# Patient Record
Sex: MALE | Race: WHITE
[De-identification: names, ages, dates, MRNs, and addresses within clinical notes are randomized per-mention and may not be internally consistent; named-entity substitution may affect disease eponyms.]

---

## 2019-02-22 NOTE — CT
CT BRAIN WITHOUT CONTRAST:

 

Date:  02/22/19 

 

INDICATION:

Syncope and confusion, with history of dementia. 

 

COMPARISON:  

None. 

 

FINDINGS:

There is moderate to severe chronic small vessel white matter ischemic change. There are remote lacun
ar infarcts involving the right corona radiata, right caudate head, left thalamus, and right globus p
allidus. Septum pellucidum and third ventricle are midline. No definite acute infarct, hemorrhage, or
 hydrocephalus is present. Mastoid air cells and paranasal sinuses are clear. 

 

IMPRESSION: 

No acute intracranial abnormality. Chronic ischemic change. 

 

 

POS: [facility]

## 2019-02-22 NOTE — CT
CT ABDOMEN AND PELVIS WITH IV CONTRAST:

 

Date:  02/22/19 

 

HISTORY:  

Syncopal episode and collapse. Right lower quadrant pain and guarding. 

 

COMPARISON:  

None. 

 

FINDINGS:

 

There is bibasilar atelectasis. 

 

No focal hepatic lesion is evident. 

 

The spleen, pancreas, and right adrenal gland are normal appearing. There is a 1.4 cm nodule involvin
g the left adrenal gland. The kidneys are normal appearing. 

 

There are moderate calcifications involving the abdominopelvic vasculature. 

 

There is a prominent amount of retained stool in the colon and rectum. There is a normal appendix in 
the right lower quadrant. The small bowel is of normal caliber. 

 

No free fluid or pathologically enlarged lymph nodes are evident. There is a large, fat-containing in
guinal hernia. 

 

No definite acute osseous abnormality is evident. There is scattered degenerative and osteoarthritic 
changes. There is diffuse osteopenia. 

 

IMPRESSION: 

1.  Prominent amount of retained stool within the rectum and colon. 

 

2.  Mild bibasilar atelectasis. 

 

3.  Nonspecific left adrenal nodule measuring 1.4 cm. Follow-up CT of the abdomen utilizing adrenal m
ass protocol may be helpful for improved characterization. 

 

4.  Other chronic findings as above. 

 

 

POS: [facility]

## 2019-02-22 NOTE — PDOC.FPRHP
- History of Present Illness


Chief Complaint: Syncopal episode


History of Present Illness: 





Pt is an 84 yo M with PMH of CABG (x4 grafts) 10 yrs prior, HTN, BPH, dementia 

who presents for a syncopal episode. His sister is his primary caregiver for 

the past month, and prior to that he was living in [place] receiving poor 

care from other family members, per the sister. She states these episodes have 

happened before, but she does not know what was done previously for them. This 

morning, he was sitting at a bar stool watching TV when she heard a crash. She 

found him on the ground, states he was responsive to his name with grunts. No 

shaking activity. When EMS arrived, he could tell them his name. Sister reports 

he is at his baseline mental status. She reports he does not drink hardly any 

water because he doesn't like the taste. He has been eating well. No other 

associated symptoms of CP. Sister reports he is to see Dr. Emmanuel for 

cardiology and Dr. Noland to be PCP. She states 1 month ago his cardiologist in 

[place] stated one of his grafts (cardiac) was completely blocked.





After arriving at ED, ED physician stated he had slurred speech, but sister 

reports this is baseline. He had no focal deficits. BUN/Cr was elevated. CT 

head neg. Abdomen was tender to palpation, CT abdomen showed retained stool. He 

received 500 ml NS bolus. EKG showed possible lateral ischemia and Q waves. 

Trop negx1.





- History


PMHx: Dementia, CAD/CABG 10 years ago (quadruple), DM2, BPH,  presumed CHF and 

depression based on medication list


 


PSHx: CABG 10 years ago, L knee surgery





FHx: non contributory


 


Social: no smoking, alcohol, or drug use


 








- Review of Systems


General: denies: fever/chills, weight/appetite/sleep changes


Eyes: denies: eye pain, vision changes


ENT: denies: nasal congestion, rhinorrhea


Respiratory: denies: cough, congestion, shortness of breath


Cardiovascular: denies: chest pain, palpitation


Gastrointestinal: reports: constipation, abdominal pain.  denies: nausea, 

vomiting, diarrhea, GI bleeding


Genitourinary: denies: dysuria, other (no hematuria)


Skin: denies: rashes, lesions


Musculoskeletal: denies: pain, tenderness, swelling


Neurological: reports: syncope.  denies: numbness, seizure





- Vital signs


BP: [140/76]  HR: [77] RR: [16] Tmax: [97.8] Pox: [98]% on [RA]  Wt: [89 kg]   








- Physical Exam


Constitutional: NAD


-Constitutional: 





Awake, oriented to self and place (hospital, could not tell name)


Neck: supple, no LAD


Heart: RRR, normal S1/S2, no murmurs/rubs/gallops


Lungs: CTAB, no respiratory distress


-Lungs: 





slightly decreased breath sounds at bases bilat


Abdomen: soft, bowel sounds present, no masses/distention, other (obese)


-Abdomen: 





slightly tender to deep palpation diffusely


Musculoskeletal: normal structure, normal tone


Skin: no rash/lesions, good turgor, capillary refill <2 seconds


Heme/Lymphatic: no unusual bruising or bleeding, no purpura


Psychiatric: normal mood and affect, other (has dementia, no oriented to date/

year)





FMR H&P: Results





- Labs


Result Diagrams: 


 02/22/19 11:58





 02/22/19 11:58


Lab results: 


 











WBC  9.6 thou/uL (4.8-10.8)   02/22/19  11:58    


 


Hgb  15.2 g/dL (14.0-18.0)   02/22/19  11:58    


 


Hct  46.0 % (42.0-52.0)   02/22/19  11:58    


 


MCV  93.4 fL (78.0-98.0)   02/22/19  11:58    


 


Plt Count  150 thou/uL (130-400)   02/22/19  11:58    


 


Neutrophils %  71.5 % (42.0-75.0)   02/22/19  11:58    


 


Sodium  136 mmol/L (136-145)   02/22/19  11:58    


 


Potassium  4.5 mmol/L (3.5-5.1)   02/22/19  11:58    


 


Chloride  100 mmol/L ()   02/22/19  11:58    


 


Carbon Dioxide  26 mmol/L (23-31)   02/22/19  11:58    


 


BUN  31 mg/dL (8.4-25.7)  H  02/22/19  11:58    


 


Creatinine  2.14 mg/dL (0.7-1.3)  H  02/22/19  11:58    


 


Glucose  184 mg/dL ()  H  02/22/19  11:58    


 


Calcium  9.5 mg/dL (7.8-10.44)   02/22/19  11:58    


 


Total Bilirubin  0.5 mg/dL (0.2-1.2)   02/22/19  11:58    


 


AST  13 U/L (5-34)   02/22/19  11:58    


 


ALT  15 U/L (8-55)   02/22/19  11:58    


 


Alkaline Phosphatase  85 U/L ()   02/22/19  11:58    


 


Creatine Kinase  41 U/L ()   02/22/19  11:58    


 


Serum Total Protein  6.2 g/dL (5.8-8.1)   02/22/19  11:58    


 


Albumin  3.8 g/dL (3.4-4.8)   02/22/19  11:58    














- EKG Interpretation


EKG: 





possible lateral ischemia, q waves in 2 and AvF





- Radiology Interpretation


  ** CT scan - head


Additional comment: 





negative





  ** CT scan - abdomen


Additional comment: 





large amount of stool present





FMR H&P: A/P





- Problem List


(1) Syncopal episodes


Current Visit: Yes   Status: Acute   Code(s): R55 - SYNCOPE AND COLLAPSE   





(2) SHAQUILLE (acute kidney injury)


Current Visit: Yes   Status: Acute   Code(s): N17.9 - ACUTE KIDNEY FAILURE, 

UNSPECIFIED   





(3) Constipation


Current Visit: Yes   Status: Acute   Code(s): K59.00 - CONSTIPATION, 

UNSPECIFIED   





(4) CHF (congestive heart failure)


Current Visit: Yes   Status: Chronic   Code(s): I50.9 - HEART FAILURE, 

UNSPECIFIED   





(5) CAD (coronary artery disease)


Current Visit: Yes   Status: Chronic   Code(s): I25.10 - ATHSCL HEART DISEASE 

OF NATIVE CORONARY ARTERY W/O ANG PCTRS   





(6) Hx of CABG


Current Visit: Yes   Status: Chronic   





(7) HTN (hypertension)


Current Visit: Yes   Status: Chronic   Code(s): I10 - ESSENTIAL (PRIMARY) 

HYPERTENSION   





(8) DM2 (diabetes mellitus, type 2)


Current Visit: Yes   Status: Chronic   





(9) BPH (benign prostatic hyperplasia)


Current Visit: Yes   Status: Chronic   Code(s): N40.0 - BENIGN PROSTATIC 

HYPERPLASIA WITHOUT LOWER URINRY TRACT SYMP   





(10) Dementia


Current Visit: Yes   Status: Chronic   Code(s): F03.90 - UNSPECIFIED DEMENTIA 

WITHOUT BEHAVIORAL DISTURBANCE   





(11) Depression


Current Visit: Yes   Status: Chronic   Code(s): F32.9 - MAJOR DEPRESSIVE 

DISORDER, SINGLE EPISODE, UNSPECIFIED   





(12) GERD (gastroesophageal reflux disease)


Current Visit: Yes   Status: Chronic   Code(s): K21.9 - GASTRO-ESOPHAGEAL 

REFLUX DISEASE WITHOUT ESOPHAGITIS   





- Plan








Syncopal episode, likely dehydration


-Admit to tele obs


-CT head negative, EKG showed Q waves, trop neg


-s/p 500 ml NS in ED


-Continue to trend trop


-TSH, prolactin pending


-Repeat EKG once on the floor


-Orthostatics





SHAQUILLE


-BUN/Cr elevated to 31/2.14, GFR 30


-Unknown what is his baseline is


-Per family, he does not consume many fluids


-s/p 500 ml NS


-Repeat AM BMP





Constipation


-CT Abdomen- full of stool


-Sennadoc, dulcolax stephanie





Presumed CHF


-Continue home furosemide, lisinopril, carvedilol





CAD with PMH CABG (quadruple)


-Continue home plavix and crestor, and aspirin





HTN


-Continue home meds





DM2


-Continue metformin, start ACHS and SSI





BPH


-Continue home vesicare





Dementia


-Continue home medication





Depression


-Continue home duloxetine





GERD


-Continue home ranitidine, stop nexium





Code status: full


Diet: CC, HH


DVT ppx: Heparin


GI ppx: continue ranitidine


PCP: to be Dr. Damaris Noland


Cardiologist: to be Dr. Emmanuel














FMR H&P: [place] [facility]





- Pertinent history





82y/o M with CAD s/p 4vCABG DM2, BPH, & dementia presenting after a syncopal 

episode. Pt was sitting on a barstool at his sisters house(his primary 

caretaker) and suddenly lost consciousness and fell onto the floor. Sister 

states he was unconscious for 2-3 minutes and was slow to become alert and more 

responsive after regaining consciousness. He did hit his head. She also states 

he moved in with them Feb 1st, and has a history of many episodes of syncope in 

the past. He has also have a CABG 7-8 yrs ago and now has reported total 

occlusion of an uncertain coronary vessel. She states he is at his baseline 

mental status at the moment. Usually does not drink much fluids, but has been 

eating normally. In ED he received a CT head and 250cc of IVF where his VS are 

now stable.  





- Pertinent findings





PE 


GEN: NAD, pleasant


CARDIO: RRR, No MRG


LUNGS: CTAB, No wheezes


ABD: Belly obese, nondistended and nontender. No masses or HSM


NEURO: A&Ox1, MAEW, head atraumatic


MSK: no pitting edema b/l, 


SKIN: no rashes or lesions


CT head: WNL


Troponin: negative


Orthostatic VS pending








- Plan


Date/Time: 02/22/19 1404








I, Gurwinder June, have evaluated this patient and agree with findings/plan as 

outlined by intern resident. Pertinent changes/additions are listed here.





1. Syncope  Likely orthostatic vs cardiac origin. Significant cardiac history, 

but RRR now and initial troponin negative. Will consult Cardiology. S/p 500cc 

bolus in ED as he is likely somewhat volume depleted. No history or evidence of 

seizure, but will order prolactin. CT head negative for ICH. 


2. Presumed CHF  No records of his history, but given occlusion of his bypass 

grafts and EKG he has a high likelihood of CHF. Will resume home CHF 

medications and monitor volume status. 


3. S/p Fall  CT head and abdomen negative. No evidence of focal deficits. 

Continue fall precautions and neuro checks. 


4. Hx/o DM   in ED; will control with SSI and resume Metformin at 

discharge.


5. BPH  Resume Flomax


6. Dementia  Pt reported at baseline per sister and very pleasant in room. 

Will continue to monitor. 


7. Constipation  Will order stool softener





Addendum - Attending





- Attending Attestation


Date/Time: 02/22/19 1692





I personally evaluated the patient and discussed the management with Dr. Villa/

[place].


I agree with the History, Examination, Assessment and Plan documented above 

with any addition or exceptions noted below.





Patient here with CAD s/p CABG several years ago here with a single syncopal 

event that occurred while sitting today. He otherwise reports feeling well. 

Labs overall non revealing though he either has SHAQUILLE or CKD. Has history of 

dementia and is cared for by his sister. EKG shows some possible lateral 

ischemia but troponins negative. Admit to obs for syncope workup. Orthostatics, 

mild volume repletion, telemetry monitoring. Further mgmt per clinical course 

and results of prelim testing.

## 2019-02-23 NOTE — PDOC.FM
- Subjective


Subjective: 





Patient feeling well overnight. No acute events. Sister states that he did not 

sleep well because he had to get up frequently to urinate.





Frequent PVCs noted on tele monitoring. Bigeminy for 5 beats noted.











- Objective


Vital Signs & Weight: 


 Vital Signs (12 hours)











  Temp Pulse Resp BP BP BP BP


 


 02/23/19 04:04  98.1 F  90  18  176/94 H   


 


 02/22/19 19:55  98.0 F  92  12   153/74 H  155/84 H  158/76 H


 


 02/22/19 18:38     187/89 H   














  Pulse Ox


 


 02/23/19 04:04  96


 


 02/22/19 19:55  92 L


 


 02/22/19 18:38 








 Weight











Weight                         87.589 kg














Result Diagrams: 


 02/23/19 04:36





 02/23/19 04:32





Phys Exam





- Physical Examination


Constitutional: NAD


HEENT: PERRLA, moist MMs


Respiratory: no wheezing, clear to auscultation bilateral


Cardiovascular: RRR, no significant murmur


Gastrointestinal: soft, non-tender, no distention


Musculoskeletal: no edema, pulses present


Neurological: non-focal, moves all 4 limbs


Psychiatric: normal affect


Skin: no rash, normal turgor, cap refill <2 seconds





Dx/Plan


(1) Syncopal episodes


Code(s): R55 - SYNCOPE AND COLLAPSE   Status: Acute   





(2) SHAQUILLE (acute kidney injury)


Code(s): N17.9 - ACUTE KIDNEY FAILURE, UNSPECIFIED   Status: Acute   





(3) Constipation


Code(s): K59.00 - CONSTIPATION, UNSPECIFIED   Status: Acute   





(4) CHF (congestive heart failure)


Code(s): I50.9 - HEART FAILURE, UNSPECIFIED   Status: Chronic   





(5) CAD (coronary artery disease)


Code(s): I25.10 - ATHSCL HEART DISEASE OF NATIVE CORONARY ARTERY W/O ANG PCTRS 

  Status: Chronic   





(6) Hx of CABG


Status: Chronic   





(7) HTN (hypertension)


Code(s): I10 - ESSENTIAL (PRIMARY) HYPERTENSION   Status: Chronic   





(8) DM2 (diabetes mellitus, type 2)


Status: Chronic   





(9) BPH (benign prostatic hyperplasia)


Code(s): N40.0 - BENIGN PROSTATIC HYPERPLASIA WITHOUT LOWER URINRY TRACT SYMP   

Status: Chronic   





(10) Dementia


Code(s): F03.90 - UNSPECIFIED DEMENTIA WITHOUT BEHAVIORAL DISTURBANCE   Status: 

Chronic   





(11) Depression


Code(s): F32.9 - MAJOR DEPRESSIVE DISORDER, SINGLE EPISODE, UNSPECIFIED   Status

: Chronic   





(12) GERD (gastroesophageal reflux disease)


Code(s): K21.9 - GASTRO-ESOPHAGEAL REFLUX DISEASE WITHOUT ESOPHAGITIS   Status: 

Chronic   





- Plan


Plan: 





Syncopal episode


-CT head negative, EKG showed Q waves, trop neg


-s/p 500 ml NS in ED


-Trop neg x3


-TSH wnl


-Prolactin elevated to 28.24 then resolved to 10, consider neurology consult 

today


-Orthostatics negative


-Consider Echo today, or follow up echo after discharge





SHAQUILLE


-BUN/Cr elevated to 31/2.14, GFR 30, improved slightly with fluids to GFR 36


-Unknown what is his baseline is


-Per family, he does not consume many fluids





Constipation


-CT Abdomen- full of stool


-Sennadoc, dulcolax stephanie





Presumed CHF


-Continue home furosemide, lisinopril, carvedilol





CAD with PMH CABG (quadruple)


-Continue home plavix and crestor, and aspirin





HTN


-Continue home meds





DM2


-Continue metformin, start ACHS and SSI





Overactive bladder/Concern for BPH


-Continue home vesicare, trospium


-start tamsulosin


-Consider outpt follow up with urology





Dementia


-Continue home medication





Depression


-Continue home duloxetine





GERD


-Continue home ranitidine, stop nexium





Code status: full


Diet: CC, HH


DVT ppx: Heparin


GI ppx: continue ranitidine


PCP: to be Dr. Damaris Noland


Cardiologist: to be Dr. Emmanuel





Addendum - Attending





- Attending Attestation


Date/Time: 02/23/19 7828





I personally evaluated the patient and discussed the management with Dr. Villa.


I agree with the History, Examination, Assessment and Plan documented above 

with any addition or exceptions noted below.





Patient improved and feels well. No recurrent syncope. No major abnormalities 

on telemetry though I note occasional PVCs on monitoring. Will ensure no issues 

noted overnight. Orthostatics negative. He has follow up on Monday with PCP and 

later in the week with cardiology. No other major source of his suspected 

syncope attack noted. Wean off O2 and likely discharge later today if feeling 

well.

## 2019-02-24 NOTE — DIS
DATE OF ADMISSION:  02/22/2019



DATE OF DISCHARGE:  02/23/2019



RESIDENT:  Anitha Villa MD.



ADMITTING ATTENDING:  Ashu Agosto MD.



DISCHARGE ATTENDING:  Ashu Agosto MD.



CONSULTS:  None.



PROCEDURES:  

1. Abdomen and pelvis CT on 02/22/2019, prominent amount of retained stool 
within

the rectum and colon. 

2. Brain CT on 02/22/2019, impression; no acute intracranial abnormality.



PRIMARY DIAGNOSIS:  Syncopal 2/2 dehydration.



SECONDARY DIAGNOSES:  

1. Acute kidney injury

2. Constipation.

3. Presumed congestive heart failure.

4. Coronary artery disease with past medical history of quadruple bypass.

5. Hypertension.

6. Diabetes mellitus, type 2.

7. Urge incontinence/concern for benign prostatic hyperplasia.

8. Dementia.

9. Depression.

10. Gastroesophageal reflux disease.

11. Probable CKD3



DISCHARGE MEDICATIONS:  

1. Rosuvastatin 20 mg p.o. at bedtime.

2. Ranitidine 150 mg p.o. b.i.d.

3. Metformin 500 mg p.o. b.i.d.

4. Ranolazine 500 mg p.o. b.i.d.

5. Memantine 10 mg p.o. b.i.d.

6. Meloxicam 15 mg p.o. daily.

7. Lisinopril 20 mg p.o. daily.

8. Furosemide 20 mg p.o. daily.

9. Esomeprazole 40 mg p.o. daily.

10. Duloxetine 20 mg p.o. daily.

11. Clopidogrel 75 mg p.o. daily.

12. Carvedilol 6.25 mg b.i.d.

13. Lubiprostone 24 mcg p.o. daily.

14. Aspirin 81 mg p.o. daily.

15. Solifenacin 10 mg p.o. daily. 

Discontinued medications, none.



HISTORY OF PRESENT ILLNESS AND HOSPITAL COURSE:  The patient is an 83-year-old 
male

with past medical history of quadruple coronary artery bypass graft 10 years ago
,

hypertension, urge incontinence, and dementia, who presented for syncopal 
episode.

His sister has been his primary caregiver over the past month; prior to that, 
he was

living in [place].  The patient's sister states that these episodes have 
happened

before, but she does not know what was previously done for them as she was not 
his

caregiver.  She reports this morning he was sitting at home on a barstool 
watching

TV, when she heard a crash and she found him on the ground.  She states that he 
was

responsive to his name with grunts.  Denied any shaking activity.  When the EMS

arrived, he could tell them his name.  His sister reports that he is at his 
baseline

mental status.  She reports that he does not drink hardly any water because he 
does

not like the taste; however, he has been eating well.  No symptoms of chest 
pain.

The sister reports that he is to see Dr. Emmanuel next week to establish care as 
well

as Dr. Noland will be his primary care provider.  Sister did state that 1 month 
ago

his cardiologist in [place] stated that one of his cardiac grafts was 
completely blocked. 



After arriving to the ED, he reportedly had slurred speech; however, his sister

reports this is his baseline.  No focal deficit.  BUN and creatinine are 
elevated.

CT head was negative.  His abdomen was tender to palpation, so a CT of abdomen 
was

done, which showed retained stool.  He received a bolus of fluids.  EKG showed

Q-waves in leads II and aVF.  Troponins were negative.  TSH was normal.  
Prolactin

was initially elevated to 28 (a few hours after the incident) and then resolved 
and then resolved to 10.

Orthostatics were negative. Patient's syncope was thought to be likely 2/2 to 
dehydration..

 The patient will follow with his primary care doctor and his cardiologist.  
Consider echo at that time outpatient. 



The patient's BUN and creatinine were elevated to 31/2.14 with a GFR of 30.  
This

improved slightly with fluids.  It is unknown what his baseline kidney function 
is, but he presumably as CKD.

Per family, he does not consume any fluids.  The patient was encouraged to drink

more fluids at home. 



Constipation.  The patient was given stool softeners. 



The patient was continued on his home VESIcare and trospium for his urge

incontinence.  Consider an outpatient followup with a urologist to evaluate for

benign prostatic hyperplasia. 



The patient was continued on his home medications during his stay.  The patient 
was

feeling well at baseline and was discharged in stable condition. 



DISPOSITION:  Stable.



DISCHARGE INSTRUCTIONS:  

1. Location; home with sister as primary caregiver.

2. Diet; diabetic diet, heart healthy diet. Encouraged adequate fluid intake.

3. Activity, as tolerated.

4. Therapy: home health.

5. Follow up with Dr. Damaris Noland, PCP, on Monday. Follow up on urge 
incontinence with concern for BPH, concern for CKD, starting home health 
therapy. Follow up with Dr. Emmanuel of Cardiology at already established 
appointment. Consider an echo evaluation if appropriate.







Job ID:  [id number]



MTDD

## 2019-10-14 ENCOUNTER — HOSPITAL ENCOUNTER (INPATIENT)
Dept: HOSPITAL 92 - ERS | Age: 84
LOS: 5 days | Discharge: HOSPICE | DRG: 682 | End: 2019-10-19
Attending: INTERNAL MEDICINE | Admitting: INTERNAL MEDICINE
Payer: MEDICARE

## 2019-10-14 VITALS — BODY MASS INDEX: 33.3 KG/M2

## 2019-10-14 DIAGNOSIS — E87.5: ICD-10-CM

## 2019-10-14 DIAGNOSIS — R59.0: ICD-10-CM

## 2019-10-14 DIAGNOSIS — Z79.84: ICD-10-CM

## 2019-10-14 DIAGNOSIS — N17.9: Primary | ICD-10-CM

## 2019-10-14 DIAGNOSIS — N18.3: ICD-10-CM

## 2019-10-14 DIAGNOSIS — E87.1: ICD-10-CM

## 2019-10-14 DIAGNOSIS — I50.9: ICD-10-CM

## 2019-10-14 DIAGNOSIS — Z79.1: ICD-10-CM

## 2019-10-14 DIAGNOSIS — K22.8: ICD-10-CM

## 2019-10-14 DIAGNOSIS — N13.2: ICD-10-CM

## 2019-10-14 DIAGNOSIS — E87.2: ICD-10-CM

## 2019-10-14 DIAGNOSIS — C20: ICD-10-CM

## 2019-10-14 DIAGNOSIS — N40.0: ICD-10-CM

## 2019-10-14 DIAGNOSIS — Z79.82: ICD-10-CM

## 2019-10-14 DIAGNOSIS — F03.90: ICD-10-CM

## 2019-10-14 DIAGNOSIS — E11.22: ICD-10-CM

## 2019-10-14 DIAGNOSIS — Z66: ICD-10-CM

## 2019-10-14 DIAGNOSIS — Z79.02: ICD-10-CM

## 2019-10-14 DIAGNOSIS — Z79.899: ICD-10-CM

## 2019-10-14 DIAGNOSIS — R13.10: ICD-10-CM

## 2019-10-14 DIAGNOSIS — Z95.1: ICD-10-CM

## 2019-10-14 DIAGNOSIS — G93.41: ICD-10-CM

## 2019-10-14 DIAGNOSIS — C79.51: ICD-10-CM

## 2019-10-14 DIAGNOSIS — Z51.5: ICD-10-CM

## 2019-10-14 DIAGNOSIS — Z99.3: ICD-10-CM

## 2019-10-14 LAB
ALBUMIN SERPL BCG-MCNC: 3.4 G/DL (ref 3.4–4.8)
ALP SERPL-CCNC: 229 U/L (ref 40–110)
ALT SERPL W P-5'-P-CCNC: 8 U/L (ref 8–55)
ANION GAP SERPL CALC-SCNC: 17 MMOL/L (ref 10–20)
ANION GAP SERPL CALC-SCNC: 17 MMOL/L (ref 10–20)
AST SERPL-CCNC: 22 U/L (ref 5–34)
BACTERIA UR QL AUTO: (no result) HPF
BASOPHILS # BLD AUTO: 0 THOU/UL (ref 0–0.2)
BASOPHILS NFR BLD AUTO: 0.3 % (ref 0–1)
BILIRUB SERPL-MCNC: 0.4 MG/DL (ref 0.2–1.2)
BUN SERPL-MCNC: 100 MG/DL (ref 8.4–25.7)
BUN SERPL-MCNC: 95 MG/DL (ref 8.4–25.7)
CALCIUM SERPL-MCNC: 8.5 MG/DL (ref 7.8–10.44)
CALCIUM SERPL-MCNC: 8.9 MG/DL (ref 7.8–10.44)
CHLORIDE SERPL-SCNC: 101 MMOL/L (ref 98–107)
CHLORIDE SERPL-SCNC: 106 MMOL/L (ref 98–107)
CK MB SERPL-MCNC: 5.2 NG/ML (ref 0–6.6)
CO2 SERPL-SCNC: 16 MMOL/L (ref 23–31)
CO2 SERPL-SCNC: 19 MMOL/L (ref 23–31)
CREAT CL PREDICTED SERPL C-G-VRATE: 0 ML/MIN (ref 70–130)
CREAT CL PREDICTED SERPL C-G-VRATE: 0 ML/MIN (ref 70–130)
EOSINOPHIL # BLD AUTO: 0.2 THOU/UL (ref 0–0.7)
EOSINOPHIL NFR BLD AUTO: 1.4 % (ref 0–10)
GLOBULIN SER CALC-MCNC: 3.4 G/DL (ref 2.4–3.5)
GLUCOSE SERPL-MCNC: 109 MG/DL (ref 83–110)
GLUCOSE SERPL-MCNC: 120 MG/DL (ref 83–110)
HGB BLD-MCNC: 10.6 G/DL (ref 14–18)
LYMPHOCYTES # BLD: 1.1 THOU/UL (ref 1.2–3.4)
LYMPHOCYTES NFR BLD AUTO: 9.7 % (ref 21–51)
MCH RBC QN AUTO: 30.4 PG (ref 27–31)
MCV RBC AUTO: 89.1 FL (ref 78–98)
MONOCYTES # BLD AUTO: 1.1 THOU/UL (ref 0.11–0.59)
MONOCYTES NFR BLD AUTO: 10.1 % (ref 0–10)
NEUTROPHILS # BLD AUTO: 8.5 THOU/UL (ref 1.4–6.5)
NEUTROPHILS NFR BLD AUTO: 78.6 % (ref 42–75)
PLATELET # BLD AUTO: 226 THOU/UL (ref 130–400)
POTASSIUM SERPL-SCNC: 5.3 MMOL/L (ref 3.5–5.1)
POTASSIUM SERPL-SCNC: 5.3 MMOL/L (ref 3.5–5.1)
PROT UR STRIP.AUTO-MCNC: 30 MG/DL
RBC # BLD AUTO: 3.47 MILL/UL (ref 4.7–6.1)
RBC UR QL AUTO: (no result) HPF (ref 0–3)
SODIUM SERPL-SCNC: 132 MMOL/L (ref 136–145)
SODIUM SERPL-SCNC: 134 MMOL/L (ref 136–145)
TROPONIN I SERPL DL<=0.01 NG/ML-MCNC: 0.2 NG/ML (ref ?–0.03)
TROPONIN I SERPL DL<=0.01 NG/ML-MCNC: 0.22 NG/ML (ref ?–0.03)
WBC # BLD AUTO: 10.9 THOU/UL (ref 4.8–10.8)
WBC UR QL AUTO: (no result) HPF (ref 0–3)

## 2019-10-14 PROCEDURE — C9113 INJ PANTOPRAZOLE SODIUM, VIA: HCPCS

## 2019-10-14 PROCEDURE — 85025 COMPLETE CBC W/AUTO DIFF WBC: CPT

## 2019-10-14 PROCEDURE — 51702 INSERT TEMP BLADDER CATH: CPT

## 2019-10-14 PROCEDURE — 96366 THER/PROPH/DIAG IV INF ADDON: CPT

## 2019-10-14 PROCEDURE — 90662 IIV NO PRSV INCREASED AG IM: CPT

## 2019-10-14 PROCEDURE — 74220 X-RAY XM ESOPHAGUS 1CNTRST: CPT

## 2019-10-14 PROCEDURE — 84540 ASSAY OF URINE/UREA-N: CPT

## 2019-10-14 PROCEDURE — 36416 COLLJ CAPILLARY BLOOD SPEC: CPT

## 2019-10-14 PROCEDURE — 80048 BASIC METABOLIC PNL TOTAL CA: CPT

## 2019-10-14 PROCEDURE — 70450 CT HEAD/BRAIN W/O DYE: CPT

## 2019-10-14 PROCEDURE — 93005 ELECTROCARDIOGRAM TRACING: CPT

## 2019-10-14 PROCEDURE — 84300 ASSAY OF URINE SODIUM: CPT

## 2019-10-14 PROCEDURE — 36415 COLL VENOUS BLD VENIPUNCTURE: CPT

## 2019-10-14 PROCEDURE — 81003 URINALYSIS AUTO W/O SCOPE: CPT

## 2019-10-14 PROCEDURE — 71045 X-RAY EXAM CHEST 1 VIEW: CPT

## 2019-10-14 PROCEDURE — 74176 CT ABD & PELVIS W/O CONTRAST: CPT

## 2019-10-14 PROCEDURE — G0008 ADMIN INFLUENZA VIRUS VAC: HCPCS

## 2019-10-14 PROCEDURE — 82570 ASSAY OF URINE CREATININE: CPT

## 2019-10-14 PROCEDURE — 84484 ASSAY OF TROPONIN QUANT: CPT

## 2019-10-14 PROCEDURE — 84443 ASSAY THYROID STIM HORMONE: CPT

## 2019-10-14 PROCEDURE — 82553 CREATINE MB FRACTION: CPT

## 2019-10-14 PROCEDURE — 83605 ASSAY OF LACTIC ACID: CPT

## 2019-10-14 PROCEDURE — 74018 RADEX ABDOMEN 1 VIEW: CPT

## 2019-10-14 PROCEDURE — 80053 COMPREHEN METABOLIC PANEL: CPT

## 2019-10-14 PROCEDURE — 84156 ASSAY OF PROTEIN URINE: CPT

## 2019-10-14 PROCEDURE — 94640 AIRWAY INHALATION TREATMENT: CPT

## 2019-10-14 PROCEDURE — 81015 MICROSCOPIC EXAM OF URINE: CPT

## 2019-10-14 PROCEDURE — 72125 CT NECK SPINE W/O DYE: CPT

## 2019-10-14 PROCEDURE — 83690 ASSAY OF LIPASE: CPT

## 2019-10-14 PROCEDURE — 96361 HYDRATE IV INFUSION ADD-ON: CPT

## 2019-10-14 PROCEDURE — 90471 IMMUNIZATION ADMIN: CPT

## 2019-10-14 PROCEDURE — 76770 US EXAM ABDO BACK WALL COMP: CPT

## 2019-10-14 PROCEDURE — S0028 INJECTION, FAMOTIDINE, 20 MG: HCPCS

## 2019-10-14 PROCEDURE — 87086 URINE CULTURE/COLONY COUNT: CPT

## 2019-10-14 PROCEDURE — 96375 TX/PRO/DX INJ NEW DRUG ADDON: CPT

## 2019-10-14 PROCEDURE — 93306 TTE W/DOPPLER COMPLETE: CPT

## 2019-10-14 PROCEDURE — 83880 ASSAY OF NATRIURETIC PEPTIDE: CPT

## 2019-10-14 PROCEDURE — 96365 THER/PROPH/DIAG IV INF INIT: CPT

## 2019-10-14 RX ADMIN — CEFTRIAXONE SCH: 1 INJECTION, POWDER, FOR SOLUTION INTRAMUSCULAR; INTRAVENOUS at 19:22

## 2019-10-14 NOTE — CT
HEAD CT WITHOUT CONTRAST:

 

Date:  10/14/19 

 

COMPARISON:  

02/22/19. 

 

HISTORY:  

Altered mental status. 

 

TECHNIQUE:  

Axial CT imaging at 5 mm intervals from vertex through skull base without contrast. 

 

FINDINGS:

There is polypoid mucosal thickening within the right maxillary sinus. Imaged paranasal sinuses and m
astoid air cells are otherwise unremarkable. There is atherosclerotic calcification of the cavernous 
carotid arteries. 

 

There is moderate diffuse cerebral volume loss with associated prominence of the CSF-containing space
s. There is multifocal periventricular, deep, and subcortical white matter hypodensity, evidence of s
mall vessel disease, stable. 

 

No intracranial hemorrhage, midline shift, or mass effect. 

 

IMPRESSION: 

Cerebral volume loss and evidence of significant small vessel disease, unchanged. No acute findings. 


 

 

POS: [facility]

## 2019-10-14 NOTE — RAD
Exam: Chest one view



HISTORY:Emesis.



Comparison: None



FINDINGS:

Cardiac silhouette:Upper normal cardiac silhouette. There are sternotomy wires.

Aorta: Atherosclerosis of the aortic knob

Pulmonary vessels: Normal

Costophrenic angles: Clear



LUNGS: Diffuse interstitial opacities. Correlate for edema or infiltrate.

Pneumothorax: None



Osseous abnormalities: None



IMPRESSION: 

1. Diffuse interstitial opacities, correlate for edema or infiltrate.

2. Atherosclerosis.



Reported By: Davina Price 

Electronically Signed:  10/14/2019 1:49 PM

## 2019-10-14 NOTE — HP
CHIEF COMPLAINT:  Dysphagia.



HISTORY OF PRESENT ILLNESS:  The patient is an 84-year-old male with a history of

CAD, hypertension, hyperlipidemia and diabetes who presents to the hospital, who was

brought into the hospital by sister with complaints of dysphagia and generalized

weakness.  The patient at baseline has dementia and is able to get up and move

around and has been having no issues with eating.  However, for the past week, the

family has noticed that the patient has had a decreased appetite, has been sleeping

a lot more.  Also, the sister noticed that the patient has been unable to eat solid

foods, only able to keep liquids.  He denies any pain with swallowing.  He denies

any abdominal pain.  The patient's sister noticed that for the past couple days, he

has been more drowsy than usual.  Again, there was no noted fevers or chills or any

abdominal pain or diarrhea or chest pain.  This is a complete change from the

patient's baseline from a week ago.  The patient's family stated that about a week

ago, he was walking with a walker to the [facility].  He was eating, had a good appetite;

however, for the past week he is unable to eat solid foods.  Has a very hard time

swallowing, but is able to keep liquids down.  Bowel movement are once a week per

family.  The patient according to family has not really had a significant weight

loss over the course from January to now, has had maybe 5-7 pounds weight loss. 



PAST MEDICAL HISTORY:  The patient has a history of:

1. Coronary artery disease.

2. Dementia.

3. Diabetes.

4. BPH.

5. Possible heart failure and depression.



PAST SURGICAL HISTORY:  He has had a CABG in 10 years and left knee surgery.



FAMILY HISTORY:  No history of heart disease.



SOCIAL HISTORY:  Denies any alcohol use, drug use, tobacco use per family.  He is a

DNAR per sister who is the power of  for this patient. 



REVIEW OF SYSTEMS:  Unable to really obtain.



PHYSICAL EXAMINATION:

VITAL SIGNS:  As of the following; temperature of 98.8, 68, 130/70, 100% on room

air. 

GENERAL:  He is awake, his eyes are closed.  He is oriented to self and family. 

CV:  S1 and S2 present.  No murmurs or gallops. 

LUNGS:  Clear to auscultation.  No rhonchi or wheezes noted. 

ABDOMEN:  Soft.  Bowel sounds present x2.  Pain upon mild palpation all over

abdomen.  No guarding noted. 

EXTREMITIES:  No edema.  Pedal pulses are present x2.   

NEUROLOGIC:  He is able to move all extremities. 

SKIN:  No cuts, lesions or bruises noted.



LABORATORY RESULTS:  As of the following; WBCs of 10.9, hemoglobin of 10.6,

hematocrit of 30.9, platelets of 226.  Chemistry:  Sodium of 132, potassium of 5.3,

BUN of 100, creatinine of 4.27, glucose of 120, alkaline phosphatase of 229.

Troponins of 0.208.  His BNP is 687. 



IMAGING:  CT head did not show any acute abnormalities just indicated some

small-vessel disease.  He also had a CT cervical spine done, which just indicated an

age determinant superior endplate fracture of the T2 vertebrae, which appears to be

chronic and also had a CT of abdomen and pelvis, which indicated a left

hydronephrosis, also possible osseous metastatic disease with mild pathological

compression fracture, perirectal lymphadenopathy stranding with mass adjacent to the

rectosigmoid junction.  There is an eccentric thickening involving the lower rectum,

which could represent a rectal mass or neoplastic process. 



ASSESSMENT AND PLAN:  The patient is an 84-year-old male, who was brought into the

hospital by family with dysphagia and generalized weakness: 

1. Acute metabolic encephalopathy.  This could be secondary to his worsening renal

function and his elevated BUN versus infectious versus malignancy.  I will start

patient on some gentle hydration for now.  We will check a renal ultrasound.  We

will get Nephrology on board.  We will put in a Hendrickson catheter and also we will

check an ammonia level on this patient.  Also start him on antibiotics.  Dysphagia,

unclear if this is secondary to his worsening dementia versus possible obstructive

cause versus possible oral Candida.  I will start patient on some IV Diflucan and

get Speech to evaluate him.  The patient's mouth was really dry.  There was no oral

lesions that were noted.  However, he did have some creamy looking secretions to the

back of his throat.  I did speak with the patient's sister in detail about the

patient's underlying issues.  She is very open to the possibility of hospice.

However, I feel that this was a very acute change from last week, this could

definitely be worsening of his dementia.  However, he has other metabolic processes

that could cause him to be so confused.  I have told her that we will watch him

overnight and if his mentation improves, may consider getting GI to kind of just

scope him to see, if there is any obstruction versus if not, then this could just be

a complete worsening of the dementia related issue. 

2. Possible urinary tract infection.  We will start the patient on some ceftriaxone

and continue to monitor. 

3. Left hydronephrosis.  Again, a Hendrickson has been inserted.  I will consider getting

Urology, if his creatinine does not improve.  The family does not want very

aggressive treatment. 

4. Sigmoid and rectal adenopathy.  Again, the patient's sister does not really want

patient to undergo colonoscopy with prep.  She wants very conservative treatment.  I

will see how he does tomorrow in the morning and then re-evaluate and talk with the

family, if they want to do more aggressive treatment. 

5. Deep venous thrombosis prophylaxis.  We will put patient on sequential

compression devices. 







Job ID:  [id number]

## 2019-10-14 NOTE — CT
CT CERVICAL SPINE WITHOUT CONTRAST:

 

Date:  10/14/19 

 

COMPARISON:  

None. 

 

HISTORY:  

Decreased mental status, progressive weakness. 

 

TECHNIQUE:  

Axial CT imaging at 2.5 mm intervals from skull base through lung apices with coronal and sagittal re
formatted imaging. 

 

FINDINGS:

Evaluation for central canal and/or neural foraminal stenosis is limited on routine CT. Mild increase
d linear interstitial density noted in the right lung apex. 

 

Midline sternotomy wires are incompletely imaged on this examination. 

 

The craniocervical junction appears intact. There is mild degenerative change at the atlantoaxial int
erspace. There is no significant anterolisthesis or retrolisthesis seen. No prevertebral soft tissue 
swelling is noted. 

 

There are postoperative clips along the course of the right carotid arterial vasculature. There is at
herosclerotic calcification involving the distal CCA and proximal ICA on the left. 

 

C2-3:  No osseous cause of significant central canal or neural foraminal stenosis. Mild anterior oste
ophyte formation. 

 

C3-4:  No osseous cause of significant central canal or neural foraminal stenosis. 

 

C4-5:  Facet and uncovertebral osteophyte formation on the right with mild right neural foraminal dianne
nosis. No osseous cause of significant central canal or left neural foraminal stenosis. 

 

C5-6:  No osseous cause of significant central canal or neural foraminal stenosis. Mild bilateral fac
et hypertrophy. 

 

C6-7:  There is disc space narrowing. There is mild uncovertebral osteophyte formation bilaterally. N
o osseous cause of significant central canal or neural foraminal stenosis. 

 

C7-T1:  No osseous cause of significant central canal or neural foraminal stenosis. 

 

There is concavity involving the superior end plate of the T2 vertebral body suggesting an age-indete
rminate superior end plate fracture of T2. There is approximately 30% loss of vertebral body height a
nteriorly. No acute osseous abnormality is seen within the cervical spine. The occipital condyles wally
ear intact. The dens is intact. C1-2 articulation, craniocervical junction, cervicothoracic junction 
appear intact. 

 

IMPRESSION: 

1.  Age-indeterminate superior end plate fracture of the T2 vertebral body with approximately 30% los
s of vertebral body height anteriorly. Chronicity of this fracture could be determined via MRI of the
 thoracic spine if clinically warranted. 

 

2.  Cervical spine degenerative change with no acute cervical spine fracture. 

 

 

POS: [facility]

## 2019-10-14 NOTE — CT
NONCONTRAST CT ABDOMEN AND PELVIS:

 

Date:  10/14/19 

 

HISTORY:  

Altered mental status and progressive weakness. Unable to keep food down for 3 days. Foul-smelling ur
ine. Decreased appetite. 

 

COMPARISON:  

02/22/19. 

 

FINDINGS:

There are small bilateral pleural effusions and associated atelectasis. 

 

Vascular calcifications are seen in the visualized coronary arteries, as well as involving the abdomi
nal aorta and iliac arteries, which were also present on prior exam. The heart is mildly enlarged. 

 

Lack of intravenous contrast does limit sensitivity for evaluation of the parenchymal organs. 

 

There is mild left hydronephrosis and hydroureter. A punctate calculus is seen in the distal left ure
ter just below the level of the left sacroiliac joint. No right renal or ureteral calculus is seen, a
nd there is no hydronephrosis seen on the right. There is suggested thickening involving the posterio
r aspect of the urinary bladder in the region of the trigone. Urinary bladder mass is a possibility, 
and direct visualization is recommended.

 

The liver, spleen, pancreas, right adrenal gland, and right kidney demonstrate a grossly normal nonen
hanced CT appearance. Mild nodular thickening of the left adrenal gland is again present. 

 

There is lymphadenopathy seen in the pelvis along the left iliac chain and in a perirectal location. 
Largest lymph node in the region of the left iliac chain measures 3.7 cm x 3.3 cm. There is an irregu
lar hypodense mass seen just to the right of the rectosigmoid junction which measures 4.6 cm x 3.4 cm
. There is suggestion of eccentric thickening involving the left lateral aspect of the rectum. A rect
al mass is a possibility with associated metastatic disease. There is mild inflammatory stranding in 
a presacral location. Lymphadenopathy or mass just to the right of the rectosigmoid junction was not 
visualized on the study on 02/22/19. 

 

A moderate amount of retained fecal material is seen throughout the colon. The appendix is visualized
 and normal in caliber. Loops of small bowel are also normal in caliber.

 

There is a heterogeneous area of sclerosis involving the S1 vertebral body, which was not identified 
on the prior CT exam. There are also areas of heterogeneous sclerosis involving the L3 and L4 vertebr
al bodies as well. Findings may be related to metastatic lesions. There is an area of slightly irregu
lar sclerosis involving the posterior superior aspect of T8 vertebral body. There is also a sclerotic
 lesion involving the left pedicle of the T12 vertebral body with irregular areas of sclerosis involv
ing each iliac bone. Findings are most suggestive of osseous metastatic disease. There is mild height
 loss involving the superior end plate of L3 vertebral body, which was not present on prior study and
 may represent pathological fracture. 

 

IMPRESSION: 

1.  Perirectal lymphadenopathy and stranding with mass adjacent to the rectosigmoid junction.  There 
is eccentric thickening involving the lower rectum which could represent a rectal mass/neoplastic pro
cess. There is also left iliac chain lymphadenopathy identified. Colonoscopy recommended.

 

2.  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 

 

3.  Mild left hydronephrosis and hydroureter with punctate calculus seen involving the distal left ur
eter. 

 

4.  Suggested thickening involving the posterior aspect of the urinary bladder in the region of the t
rigone. Urinary bladder mass is a possibility, and direct visualization is recommended. 

 

5.  Small bilateral pleural effusions. 

 

6.  Additional incidental findings as described above. 

 

Above findings discussed with Wilber Soriano NP, in the emergency department on 10/14/19 at 1354 hour
s. 

 

CODE CR.

## 2019-10-15 LAB
ANION GAP SERPL CALC-SCNC: 17 MMOL/L (ref 10–20)
BASOPHILS # BLD AUTO: 0 THOU/UL (ref 0–0.2)
BASOPHILS NFR BLD AUTO: 0.3 % (ref 0–1)
BUN SERPL-MCNC: 85 MG/DL (ref 8.4–25.7)
CALCIUM SERPL-MCNC: 8.6 MG/DL (ref 7.8–10.44)
CHLORIDE SERPL-SCNC: 107 MMOL/L (ref 98–107)
CO2 SERPL-SCNC: 13 MMOL/L (ref 23–31)
CREAT CL PREDICTED SERPL C-G-VRATE: 18 ML/MIN (ref 70–130)
EOSINOPHIL # BLD AUTO: 0.1 THOU/UL (ref 0–0.7)
EOSINOPHIL NFR BLD AUTO: 1.4 % (ref 0–10)
GLUCOSE SERPL-MCNC: 136 MG/DL (ref 83–110)
HGB BLD-MCNC: 10.8 G/DL (ref 14–18)
LYMPHOCYTES # BLD: 0.8 THOU/UL (ref 1.2–3.4)
LYMPHOCYTES NFR BLD AUTO: 8.6 % (ref 21–51)
MCH RBC QN AUTO: 30.3 PG (ref 27–31)
MCV RBC AUTO: 91.7 FL (ref 78–98)
MONOCYTES # BLD AUTO: 1 THOU/UL (ref 0.11–0.59)
MONOCYTES NFR BLD AUTO: 10.3 % (ref 0–10)
NEUTROPHILS # BLD AUTO: 7.8 THOU/UL (ref 1.4–6.5)
NEUTROPHILS NFR BLD AUTO: 79.4 % (ref 42–75)
PLATELET # BLD AUTO: 223 THOU/UL (ref 130–400)
POTASSIUM SERPL-SCNC: 5.2 MMOL/L (ref 3.5–5.1)
PROT UR-MCNC: 69 MG/DL (ref 1–14)
RBC # BLD AUTO: 3.56 MILL/UL (ref 4.7–6.1)
SODIUM SERPL-SCNC: 132 MMOL/L (ref 136–145)
SODIUM UR-SCNC: 71 MMOL/L
UUN UR-MCNC: 698 MG/DL
WBC # BLD AUTO: 9.8 THOU/UL (ref 4.8–10.8)

## 2019-10-15 RX ADMIN — FAMOTIDINE SCH MG: 10 INJECTION, SOLUTION INTRAVENOUS at 20:05

## 2019-10-15 RX ADMIN — CEFTRIAXONE SCH MLS: 1 INJECTION, POWDER, FOR SOLUTION INTRAMUSCULAR; INTRAVENOUS at 17:23

## 2019-10-15 NOTE — RAD
Esophagram



HISTORY: Dysphagia.



FINDINGS: Single column contrast evaluation. There is marked decrease in primary and secondary perist
alsis of the esophagus, stomach, and duodenum. Nonpropulsive, tertiary type contractions of the

esophagus were apparent.



Small hiatal hernia. Large amount of gastroesophageal reflux. No evidence of obstruction.















IMPRESSION: Severe presbyesophagus. Diminished motility of the stomach and duodenum.



No evidence of obstruction.



Reported By: LETICIA King 

Electronically Signed:  10/15/2019 3:46 PM

## 2019-10-15 NOTE — ULT
Renal ultrasound:

10/15/2019



COMPARISON: None



HISTORY: Acute kidney injury, obstructive uropathy



TECHNIQUE: Multiplanar grayscale sonographic imaging of the kidneys and urinary bladder obtained.



FINDINGS: Right kidney measures 8.6 x 8.2 x 4.6 cm. No discrete mass, hydronephrosis, or stone noted 
on the right.



Secondary to bowel gas, the left kidney cannot be well visualized. Urinary bladder is poorly assessed
 as it is decompressed and contains a Hendrickson catheter.



IMPRESSION: Suboptimal visualization/assessment of the left kidney and urinary bladder. Right kidney 
grossly unremarkable.



Reported By: Ashu Armijo 

Electronically Signed:  10/15/2019 9:22 AM Subjective   History of Present Illness  This 18-year-old female is brought to the emergency department for evaluation of abdominal discomfort.  The patient awoke this morning with a vague generalized abdominal discomfort which has persisted throughout the day.  The discomfort has gradually worsened through the day and it seems to have moved coming to rest more in the right lower quadrant of her abdomen.  She's felt a vague sense of nausea but no actual vomiting she states that she really hasn't been able to eat much of anything through the day.  The nurse triage note notes fever as a complaint however the temperatures that they give me of 99.3 and 99.5° file sort of a true fever.  She denies associated dysuria urgency or frequency she denies cough cold or runny nose she denies associated back pain.  The patient has irregular menses and as a result is on low-dose birth control pill.  Her graft past medical history as noted    Current medications as noted    Previous surgeries none    Social history she is not exposed to secondhand smoke and does not smoke herself  Review of Systems   Constitutional: Negative for chills and fever.   Respiratory: Negative for cough.    Cardiovascular: Negative for chest pain.   Gastrointestinal: Positive for abdominal pain and nausea. Negative for diarrhea and vomiting.   Genitourinary: Negative for dysuria, frequency, urgency and vaginal bleeding.       History reviewed. No pertinent past medical history.    No Known Allergies    History reviewed. No pertinent surgical history.    Family History   Problem Relation Age of Onset   • Diabetes Mother    • Diabetes Father        Social History     Social History   • Marital status: Single     Spouse name: N/A   • Number of children: N/A   • Years of education: N/A     Social History Main Topics   • Smoking status: Never Smoker   • Smokeless tobacco: Never Used   • Alcohol use No   • Drug use: No   • Sexual activity: Not Asked     Other  Topics Concern   • None     Social History Narrative           Objective   Physical Exam   Constitutional: She is oriented to person, place, and time. She appears well-developed and well-nourished.   HENT:   Head: Normocephalic and atraumatic.   Eyes: Pupils are equal, round, and reactive to light. No scleral icterus.   Neck: Normal range of motion. Neck supple.   Cardiovascular: Normal rate, regular rhythm and normal heart sounds.    Pulmonary/Chest: Effort normal and breath sounds normal. No respiratory distress. She has no wheezes. She has no rales.   Abdominal: Soft. Bowel sounds are normal. She exhibits no distension. There is tenderness. There is no rebound and no guarding.   Musculoskeletal: She exhibits no edema.   Lymphadenopathy:     She has no cervical adenopathy.   Neurological: She is alert and oriented to person, place, and time. No cranial nerve deficit. Coordination normal.   Skin: Skin is warm and dry.   Psychiatric: She has a normal mood and affect. Her behavior is normal.   Nursing note and vitals reviewed.    On abdominal examination the patient does have reproducible right lower quadrant tenderness with palpation there is no becki guarding or rebound at this time however palpation of the left side of the abdomen does reproduce referred pain in the right.    The patient's CT scan does show evidence for an acute appendicitis it is on perforated.  The patient's white count is elevated 17,000.  Antibiotics have been ordered.  I've spoken with the surgeon who plans to take her to surgery first thing in the morning.  He will see her later this evening to place orders on the chart when he finishes in the operating room with his 2 other emergency surgeries.  Procedures         ED Course  ED Course                  MDM    Final diagnoses:   Acute appendicitis, unspecified acute appendicitis type            Peewee Reed MD  06/01/17 0121

## 2019-10-15 NOTE — CON
DATE OF CONSULTATION:  10/15/2019



SERVICE:  Nephrology.



REQUESTING PHYSICIAN:  Marisa Rodriguez MD



REASON FOR CONSULTATION:  Acute renal failure.



HISTORY OF PRESENT ILLNESS:  An 84-year-old male with known history of coronary

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

worsening dementia; who was admitted due to worsening vomiting and difficulty

swallowing associated with excessive sleepiness.  The patient with dementia and

chronic debility, who is currently wheelchair bound.  He is reported to have

worsening difficulty swallowing or eating.  The patient since about 4 months ago has

been having vomiting after eating, hence was started on some antiemetics, which had

helped some, though he continued to have some emesis during eating.  In the last few

days, caregiver, his sister, has reported that the patient has had more difficulty

eating solids, hence they had resulted to liquid diet only, but in the last few days

again vomiting had worsened and was associated with excessive sleepiness, hence

presentation to the ER, where the patient was found to have markedly elevated BUN

and creatinine above his baseline.  Sister, who is the primary caregiver reported

that the patient has lost about 10 pounds since January.  There is no history of

hematemesis or hematochezia.  There is also no history of hematuria.  The patient in

the last few days is complaining of lower abdominal pain.  There is no history of

dysuria, fever, worsening shortness of breath, focal weakness, worsening leg

swelling, or change in bowel habit.  The patient normally has about 1 stool per

week.  Above history is obtained from review of medical record and talking to the

primary caregiver as the patient was unable to provide any significant history due

to advanced dementia.  Further evaluation in the ER with CT scan of the abdomen

showed mild right hydronephrosis with presumed calculus as well as rectal mass with

perirectal lymphadenopathy and possible bladder wall thickening at the trigone.

Review of medical record also showed that the patient has baseline CKD with

creatinine ranging from 1.55 to 2.14 from February 2019 to August 2019 with last

creatinine of 1.55 on August 22nd.  On presentation yesterday, creatinine was 4.24,

which has improved to 3.71.  BUN also was 100 on presentation and is down to 85. 



PAST MEDICAL HISTORY:  

1. Coronary artery disease, status post CABG.

2. Dementia.

3. Diabetes mellitus.

4. BPH.



PAST SURGICAL HISTORY:  Coronary artery bypass and left knee surgery.



FAMILY HISTORY:  Reviewed, but noncontributory.  No history of heart disease in the

family. 



SOCIAL HISTORY:  The patient lives with family.  No history of alcohol, drug, or

tobacco use.  He is currently wheelchair bound and has advanced dementia and has

been taken care of by sister. 



ALLERGIES:  SULFONAMIDES.



MEDICATIONS:  Home medications:  

1. Ergocalciferol 50,000 units every 7 days.

2. Aspirin 81 mg p.o. daily.

3. Carvedilol 6.25 p.o. b.i.d.

4. Plavix 75 mg p.o. daily.

5. Cymbalta 20 mg p.o. daily.

6. Nexium 40 mg p.o. daily.

7. Furosemide 20 mg p.o. daily.

8. Lisinopril 20 mg p.o. daily.

9. Meloxicam 50 mg p.o. daily.

10. Memantine 10 mg p.o. b.i.d.

11. Metformin 500 mg p.o. b.i.d.

12. Metoclopramide 5 mg p.o. b.i.d.

13. Ranexa 500 mg p.o. b.i.d.

14. Crestor 20 mg p.o. daily at bedtime.

15. Sennosides docusate 2 tablets p.o. daily at bedtime as needed.

16. VESIcare 10 mg p.o. daily. 



Current medications:  

1. Ceftriaxone 1 g IV daily.

2. 5% dextrose saline at 75 mL/h.

3. Fluconazole 200 mg x1.

4. Pepcid 20 mg IV x1.

5. Lovenox 40 mg subcutaneous daily.

6. Acetaminophen p.r.n.



REVIEW OF SYSTEMS:  Could not be performed due to the patient's condition.



PHYSICAL EXAMINATION:

VITAL SIGNS:  Temperature 98.6, pulse 89, respiratory rate 16, SpO2 of 93% on room

air, and blood pressure is 138/64. 

GENERAL:  Healthy-looking elderly male, in no obvious distress.  Afebrile.

Anicteric.  Acyanotic. 

HEENT:  Normocephalic, atraumatic.  Oral mucosa is moist. 

NECK:  Supple, nontender, and symmetrical.  No obvious JVD appreciated. 

CARDIOVASCULAR:  Regular rhythm and rate with normal heart sounds 1 and 2. 

RESPIRATORY:  Fair air entry bilaterally with some transmitted breath sounds. 

GASTROINTESTINAL:  Full, soft, mild tenderness in bilateral lower quadrants noted.

Bowel sound is normoactive. 

UROGENITAL:  Hendrickson catheter is in place, draining some urine. 

EXTREMITIES:  Grossly normal looking, atraumatic with no obvious edema or erythema. 

CENTRAL NERVOUS SYSTEM:  Conscious and alert.  Oriented to person.  Moves all

extremities.  Face is symmetrical with grossly normal cranial nerves. 



DIAGNOSTIC DATA:  BMP today showed sodium 132, potassium 5.2, chloride 107, CO2 of

13, anion gap 17, BUN 85, creatinine 3.71, glucose 136, and calcium 8.6.  Of note,

creatinine is down from 4.24 on admission and BUN is down from 100 on admission. 



CBC showed WBC count of 9.8, hemoglobin of 10.8, MCV of 91.7, and platelet of 223. 



Urinalysis on presentation showed yellow turbid urine with pH of 5.0, specific

gravity of 1.014, protein 30 mg/dL, 1+ blood, negative ketone, nitrite, bilirubin,

and leukocyte esterase.  Microscopy showed 0 to 3 rbc and 4 to 6 wbc. 



Chest x-ray on presentation on October 14, 2019, showed diffuse interstitial

opacities suggestive of possible edema or infiltrate as well as atherosclerosis.  CT

scan of the abdomen and pelvis without contrast showed perirectal lymphadenopathy

and stranding with mass adjacent to the rectosigmoid junction.  Osseous metastasis

with mild pathology compression fracture of L3 vertebral body as well as mild left

hydronephrosis and hydroureter with punctate calculus seen involving the distal left

ureter, thickening of the posterior aspect of the urinary bladder in the region of

the trigone concerning for urinary bladder as well as small bilateral pleural

effusion were noted. 



ASSESSMENT:  

1. Acute renal failure:  This is multifactorial from volume depletion due to poor

oral intake and increased vomiting in a patient taking NSAID and lisinopril as well

as diuretics.  The patient also has chronic kidney disease as well.  Obstructive

uropathy may be contributory given left hydronephrosis noted. 

2. 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

3. Nephrolithiasis with obstruction.

4. Metabolic acidosis.

5. Mild hyperkalemia.

6. Hyponatremia:  Most likely due to volume depletion with appropriate ADH

secretion.  However, given increased possibility of cancer, syndrome of

inappropriate antidiuretic hormone secretion is a possibility. 

7. Worsening dysphagia concerning for esophageal obstruction.

8. Rectal mass with lymphadenopathy, concerning for metastatic cancer.

9. Osseous metastasis.



PLAN:  I had a long discussion with the primary caregiver, the patient's sister, who

emphasized that she does not want invasive procedure, that she wants the patient to

be comfort.  In view of this, I will recommend IV fluid therapy at this point as the

patient might not be in a position to dialytic treatment even if is needed, though

not indicated at this time.  Also given the possibility of metastatic cancer,

comfort care seems to be the most appropriate line of action given that the patient

is wheelchair bound at this time with advanced dementia.  We will, however, get

renal ultrasound as well as electrolytes and start the patient on sodium bicarbonate

infusion to address hyperkalemia and metabolic acidosis and this will also help

improvement of the renal function.  Since care plan is palliation, evaluation of the

dysphagia will help the patient's condition and 

improve oral intake.  At this point, I will order barium swallow as this will help

delineate the cause of the dysphagia, which may be amenable to dilatation. 



Many thanks for involving us in the care of this patient.  We will continue to

follow along with you. 







Job ID:  [id number]

## 2019-10-15 NOTE — PDOC.HOSPP
- Subjective


Encounter Date: 10/15/19


Encounter Time: 13:00


Subjective: 





pt up in bed no complains. 





- Objective


Vital Signs & Weight: 


 Vital Signs (12 hours)











  Temp Pulse Resp BP Pulse Ox


 


 10/15/19 12:52  98.7 F  96  16  150/73 H  100


 


 10/15/19 08:00  98.5 F  91  16  131/58 L  95








 Weight











Admit Weight                   182 lb 6.4 oz


 


Weight                         182 lb 6.4 oz














I&O: 


 











 10/14/19 10/15/19 10/16/19





 06:59 06:59 06:59


 


Output Total  1100 


 


Balance  -1100 











Result Diagrams: 


 10/15/19 05:44





 10/15/19 05:44





Hospitalist ROS





- Review of Systems


Other: 





unable to obtain





- Medication


Medications: 


Active Medications











Generic Name Dose Route Start Last Admin





  Trade Name Freq  PRN Reason Stop Dose Admin


 


Enoxaparin Sodium  30 mg  10/15/19 09:00  10/15/19 09:00





  Lovenox  SC   Not Given





  [facility]   





     





     





     





     


 


Ceftriaxone Sodium 1 gm/  100 mls @ 200 mls/hr  10/14/19 18:00  10/14/19 19:22





  Sodium Chloride  IVPB   Not Given





  1800 L[facility]   





     





     





     





     


 


Sodium Bicarbonate 150 meq/  1,150 mls @ 100 mls/hr  10/15/19 08:15  10/15/19 10

:47





  Dextrose/Water  IV   1,150 mls





  INF LANDON   Administration





     





     





     





     














- Exam


Heart: negative: RRR, no murmur, no gallops, no rubs, normal peripheral pulses, 

irregular, diminshed peripheral pulses, murmur present, II/IV, III/IV


Respiratory: negative: CTAB, no wheezes, no rales, no ronchi, normal chest 

expansion, no tachypnea, normal percussion, rales, rhonchi, tachypneic, wheezes


Gastrointestinal: negative: soft, non-tender, non-distended, normal bowel sounds

, no palpable masses, no hepatomegaly, no splenomegaly, no bruit, no guarding, 

no rigidity, tender to palpation, distended, diminished bowl sounds, voluntary 

guarding


Extremities: negative: no cyanosis, no clubbing, no edema, 1+ LE edema, 2+ LE 

edema, clubbing





Hosp A/P


(1) Presbyesophagus


Code(s): K22.8 - OTHER SPECIFIED DISEASES OF ESOPHAGUS   Status: Acute   





(2) SHAQUILLE (acute kidney injury)


Code(s): N17.9 - ACUTE KIDNEY FAILURE, UNSPECIFIED   Status: Acute   





(3) DM2 (diabetes mellitus, type 2)


Status: Chronic   





(4) Dementia


Code(s): F03.90 - UNSPECIFIED DEMENTIA WITHOUT BEHAVIORAL DISTURBANCE   Status: 

Chronic   





(5) HTN (hypertension)


Code(s): I10 - ESSENTIAL (PRIMARY) HYPERTENSION   Status: Chronic   





(6) UTI (urinary tract infection)


Status: Acute   





(7) Hydronephrosis, left


Code(s): N13.30 - UNSPECIFIED HYDRONEPHROSIS   Status: Acute   





- Plan





pt's barium swallow indicated presbyesophagus, will start ppi and pt will need 

his food to be pureed. will continue abx, his mentation has improved.

## 2019-10-16 LAB
ANION GAP SERPL CALC-SCNC: 15 MMOL/L (ref 10–20)
BUN SERPL-MCNC: 68 MG/DL (ref 8.4–25.7)
CALCIUM SERPL-MCNC: 8.9 MG/DL (ref 7.8–10.44)
CHLORIDE SERPL-SCNC: 102 MMOL/L (ref 98–107)
CO2 SERPL-SCNC: 24 MMOL/L (ref 23–31)
CREAT CL PREDICTED SERPL C-G-VRATE: 19 ML/MIN (ref 70–130)
GLUCOSE SERPL-MCNC: 185 MG/DL (ref 83–110)
POTASSIUM SERPL-SCNC: 4.9 MMOL/L (ref 3.5–5.1)
SODIUM SERPL-SCNC: 136 MMOL/L (ref 136–145)

## 2019-10-16 RX ADMIN — CEFTRIAXONE SCH MLS: 1 INJECTION, POWDER, FOR SOLUTION INTRAMUSCULAR; INTRAVENOUS at 17:17

## 2019-10-16 RX ADMIN — FAMOTIDINE SCH MG: 10 INJECTION, SOLUTION INTRAVENOUS at 20:49

## 2019-10-16 RX ADMIN — ASPIRIN SCH MG: 81 TABLET ORAL at 09:35

## 2019-10-16 RX ADMIN — Medication SCH ML: at 20:49

## 2019-10-16 NOTE — PRG
DATE OF SERVICE:  10/16/2019



SERVICE:  Nephrology.



SUBJECTIVE:  An 84-year-old male, being followed up for acute renal failure.  
The

patient is still confused.  No new problem. 



OBJECTIVE:  VITAL SIGNS:  Temperature 97.6, pulse 87, respiratory rate 16, SpO2 
of

97 on 2 L nasal cannula, blood pressure is 137/65. 

GENERAL:  Comfortable, elderly male.  Afebrile, anicteric, acyanotic. 

HEENT:  Normocephalic and atraumatic. 

CARDIOVASCULAR:  Regular rhythm and rate with normal heart sounds 1 and 2. 

RESPIRATORY:  Fair air entry bilaterally with some transmitted breath sounds. 

GI:  Full, soft, with normal bowel sounds. 

UROGENITAL:  Hendrickson catheter is in place. 

EXTREMITIES:  Grossly normal looking, atraumatic with no edema or erythema. 

CNS:  Conscious and alert, oriented to person only, a bit confused.  Moves all

extremities spontaneously. 



DIAGNOSTIC DATA:  BMP today showed sodium of 136, potassium 4.9, chloride 102, 
CO2

of 24, anion gap 15, BUN 68, creatinine 3.51, glucose 185, calcium 8.9. 



ASSESSMENT:  

1. Acute on chronic renal failure:  Multifactorial in etiology with hemodynamic

factors related to volume depletion from poor oral intake and increased

gastrointestinal losses, interplaying with effect of NSAID and lisinopril use.  
The

patient also had some obstructive uropathy.  Creatinine is trending downwards, 
but

not as expected. 

2. Chronic kidney disease, stage 3/4.

3. Nephrolithiasis with obstruction.

4. Metabolic acidosis:  Improved with sodium bicarbonate infusion.

5. Hyperkalemia:  Resolved.

6. Hyponatremia:  Due to volume depletion with appropriate ADH secretion:  
Resolved

with crystalloid therapy. 

7. Presumed metastatic rectal cancer.

8. Osseous metastasis.



PLAN:  

1. We will substitute sodium bicarbonate infusion with sodium chloride with some

bicarb and runs him at current dose, given resolution of metabolic acidosis. 

2. We will recheck renal function in the morning.

3. The patient's caregiver still inclined to comfort care.  We will defer to 
primary

attending for arrangement of comfort care.  









Job ID:  [id number]



MTDD

## 2019-10-16 NOTE — PDOC.HOSPP
- Subjective


Encounter Date: 10/16/19


Encounter Time: 09:45


Subjective: 





pt up in bed appears confused





- Objective


Vital Signs & Weight: 


 Vital Signs (12 hours)











  Temp Pulse Resp BP Pulse Ox


 


 10/16/19 14:56   87  16   97


 


 10/16/19 08:46      94 L


 


 10/16/19 08:00  97.6 F  96  20  137/65  94 L








 Weight











Admit Weight                   182 lb 6.4 oz


 


Weight                         182 lb 6.4 oz














I&O: 


 











 10/15/19 10/16/19 10/17/19





 06:59 06:59 06:59


 


Intake Total  480 


 


Output Total 1100 900 


 


Balance -1100 -420 











Result Diagrams: 


 10/15/19 05:44





 10/16/19 08:52





Hospitalist ROS





- Review of Systems


Respiratory: denies: cough, dry, shortness of breath, hemoptysis, SOB with 

excertion, pleuritic pain, sputum, wheezing, other


Cardiovascular: denies: chest pain, palpitations, orthopnea, paroxysmal noc. 

dyspnea, edema, light headedness, other


Gastrointestinal: denies: nausea, vomiting, abdominal pain, diarrhea, 

constipation, melena, hematochezia, other





- Medication


Medications: 


Active Medications











Generic Name Dose Route Start Last Admin





  Trade Name Freq  PRN Reason Stop Dose Admin


 


Acetaminophen  650 mg  10/14/19 17:44  10/16/19 04:17





  Tylenol  PO   650 mg





  Q4H PRN   Administration





  Headache/Fever/Mild Pain (1-3)   





     





     





     


 


Albuterol/Ipratropium  3 ml  10/16/19 11:00  10/16/19 14:56





  Duoneb  NEB   3 ml





  [id number] LANDON   Administration





     





     





     





     


 


Aspirin  81 mg  10/16/19 09:00  10/16/19 09:35





  Ecotrin  PO   81 mg





  DAILY LANDON   Administration





     





     





     





     


 


Carvedilol  6.25 mg  10/16/19 09:00  10/16/19 09:36





  Coreg  PO   6.25 mg





  BID LANDON   Administration





     





     





     





     


 


Duloxetine HCl  20 mg  10/16/19 09:00  10/16/19 09:58





  Cymbalta  PO   20 mg





  DAILY LANDON   Administration





     





     





     





     


 


Enoxaparin Sodium  30 mg  10/15/19 09:00  10/16/19 08:53





  Lovenox  SC   30 mg





  0900 [facility]   Administration





     





     





     





     


 


Famotidine  20 mg  10/15/19 21:00  10/15/19 20:05





  Pepcid  SLOW IVP   20 mg





  [id number] LANDON   Administration





     





     





     





     


 


Ceftriaxone Sodium 1 gm/  100 mls @ 200 mls/hr  10/14/19 18:00  10/15/19 17:23





  Sodium Chloride  IVPB   100 mls





  1800 [facility]   Administration





     





     





     





     


 


Sodium Bicarbonate 150 meq/  1,150 mls @ 100 mls/hr  10/15/19 08:15  10/16/19 15

:45





  Dextrose/Water  IV   1,150 mls





  INF LANDON   Administration





     





     





     





     


 


Melatonin  6 mg  10/15/19 22:53  10/15/19 22:59





  Melatonin  PO   6 mg





  HS PRN   Administration





  Insomnia   





     





     





     


 


Pantoprazole Sodium  40 mg  10/16/19 09:00  10/16/19 10:01





  Protonix  IVP   40 mg





  BID LANDON   Administration





     





     





     





     


 


Ranolazine  500 mg  10/16/19 09:00  10/16/19 09:58





  Ranexa  PO   500 mg





  BID LANDON   Administration





     





     





     





     


 


Trospium  20 mg  10/16/19 09:00  10/16/19 09:57





  Trospium  PO   20 mg





  BID LANDON   Administration





     





     





     





     














- Exam


Heart: negative: RRR, no murmur, no gallops, no rubs, normal peripheral pulses, 

irregular, diminshed peripheral pulses, murmur present, II/IV, III/IV


Respiratory: negative: CTAB, no wheezes, no rales, no ronchi, normal chest 

expansion, no tachypnea, normal percussion, rales, rhonchi, tachypneic, wheezes


Gastrointestinal: negative: soft, non-tender, non-distended, normal bowel sounds

, no palpable masses, no hepatomegaly, no splenomegaly, no bruit, no guarding, 

no rigidity, tender to palpation, distended, diminished bowl sounds, voluntary 

guarding





Hosp A/P


(1) Presbyesophagus


Code(s): K22.8 - OTHER SPECIFIED DISEASES OF ESOPHAGUS   Status: Acute   





(2) SHAQUILLE (acute kidney injury)


Code(s): N17.9 - ACUTE KIDNEY FAILURE, UNSPECIFIED   Status: Acute   





(3) DM2 (diabetes mellitus, type 2)


Status: Chronic   





(4) Dementia


Code(s): F03.90 - UNSPECIFIED DEMENTIA WITHOUT BEHAVIORAL DISTURBANCE   Status: 

Chronic   





(5) HTN (hypertension)


Code(s): I10 - ESSENTIAL (PRIMARY) HYPERTENSION   Status: Chronic   





(6) UTI (urinary tract infection)


Status: Acute   





(7) Hydronephrosis, left


Code(s): N13.30 - UNSPECIFIED HYDRONEPHROSIS   Status: Acute   





(8) Rectal cancer


Code(s): C20 - MALIGNANT NEOPLASM OF RECTUM   Status: Acute   





- Plan





pt's barium swallow indicated presbyesophagus, will start ppi and pt will need 

his food to be pureed. will continue abx, his mentation has improved. 





10/16 will start pt on magcitrate since he has not had a bm for a few days per 

family. family does not want anything aggressive. pt's sister wants him to be 

comfortable and possible hospice.

## 2019-10-17 LAB
ANION GAP SERPL CALC-SCNC: 14 MMOL/L (ref 10–20)
BUN SERPL-MCNC: 55 MG/DL (ref 8.4–25.7)
CALCIUM SERPL-MCNC: 8.5 MG/DL (ref 7.8–10.44)
CHLORIDE SERPL-SCNC: 100 MMOL/L (ref 98–107)
CO2 SERPL-SCNC: 26 MMOL/L (ref 23–31)
CREAT CL PREDICTED SERPL C-G-VRATE: 23 ML/MIN (ref 70–130)
GLUCOSE SERPL-MCNC: 123 MG/DL (ref 83–110)
POTASSIUM SERPL-SCNC: 4.8 MMOL/L (ref 3.5–5.1)
SODIUM SERPL-SCNC: 135 MMOL/L (ref 136–145)

## 2019-10-17 RX ADMIN — Medication SCH ML: at 08:34

## 2019-10-17 RX ADMIN — Medication SCH ML: at 22:40

## 2019-10-17 RX ADMIN — ASPIRIN SCH MG: 81 TABLET ORAL at 08:33

## 2019-10-17 NOTE — PRG
DATE OF SERVICE:  10/17/2019



SERVICE:  Nephrology.



SUBJECTIVE:  An 84-year-old male, being followed up for acute on chronic renal

failure.  Family is contemplating hospice care.  No new problem.  The patient had

emesis earlier today after eating. 



OBJECTIVE:  VITAL SIGNS:  Temperature 98.3, pulse 103, respiratory rate 16, SpO2 of

95% on 2 L nasal cannula, blood pressure is 139/67. 

GENERAL:  Elderly male, in no obvious distress.  Afebrile.  Anicteric.  Acyanotic. 

HEENT:  Normocephalic, atraumatic.  Oral mucosa is moist. 

CARDIOVASCULAR:  Regular rhythm and rate, but tachycardic. 

RESPIRATORY:  Fair air entry with few transmitted breath sound and scattered

rhonchi. 

GI:  Full, soft, nontender, nondistended with hypoactive bowel sounds. 

UGS:  Hendrickson catheter is in place. 

EXTREMITIES:  Grossly normal looking, atraumatic with no obvious edema or erythema. 

CNS:  The patient is sleeping.  He, however, wakes up with stimulation.  Oriented

only to person. 



DIAGNOSTIC DATA:  BMP showed sodium 135, potassium 4.8, chloride 100, CO2 of 26, BUN

55, creatinine 2.96, glucose 123, calcium 8.5. 



ASSESSMENT:  

1. Acute on chronic renal failure:  Multifactorial from hemodynamic factors as well

as contribution from obstructive uropathy. 

2. Metabolic acidosis:  Resolved.

3. Hyponatremia:  Improved, though sodium is lower today at 135.

4. Constipation.

5. Presbyesophagus with slow gastrointestinal motility:  Gastroparesis is a concern

given history of diabetes.  The patient has some improvement with Reglan. 

6. Hypertension:  Control is acceptable.



PLAN:  Continue IV fluid therapy given poor oral intake.  Recommend commencement of

Reglan with meals.  This will help with improved GI motility and reduce emesis.

Frequent oral intake, but more portion is recommended to reduce emesis.  Given that

hospice is contemplated and this seems most appropriate for this patient with

metastatic colon cancer and advanced dementia, I will sign off at this time.

Further treatment will be by primary attending.  Call for any questions. 







Job ID:  [id number]

## 2019-10-17 NOTE — RAD
EXAM: Single view of the abdomen



HISTORY: Abdominal distention



COMPARISON: None



FINDINGS: Single view of the abdomen shows a nonspecific, nonobstructive bowel gas pattern. Contrast 
is seen in the transverse colon from previous contrast examination. No suspicious calcifications

are seen.   The bones are unremarkable.



IMPRESSION: Unremarkable exam



Reported By: Antwan Calero 

Electronically Signed:  10/17/2019 9:54 AM

## 2019-10-17 NOTE — PDOC.HOSPP
- Subjective


Encounter Date: 10/17/19


Encounter Time: 08:45


Subjective: 





pt up in bed sister at bedside no bowel movement. 





- Objective


Vital Signs & Weight: 


 Vital Signs (12 hours)











  Temp Pulse Resp BP Pulse Ox


 


 10/17/19 18:23   83  18   97


 


 10/17/19 14:03   90  16  


 


 10/17/19 10:42   103 H  16   95


 


 10/17/19 08:00  98.3 F  86  14  139/67  95


 


 10/17/19 06:50   85  20   95








 Weight











Admit Weight                   182 lb 6.4 oz


 


Weight                         182 lb 6.4 oz














I&O: 


 











 10/16/19 10/17/19 10/18/19





 06:59 06:59 06:59


 


Intake Total 


 


Output Total 900 1600 600


 


Balance -420 -700 900











Result Diagrams: 


 10/15/19 05:44





 10/17/19 06:22





Hospitalist ROS





- Review of Systems


Other: 





unable to obtain





- Medication


Medications: 


Active Medications











Generic Name Dose Route Start Last Admin





  Trade Name Freq  PRN Reason Stop Dose Admin


 


Acetaminophen  650 mg  10/14/19 17:44  10/16/19 04:17





  Tylenol  PO   650 mg





  Q4H PRN   Administration





  Headache/Fever/Mild Pain (1-3)   





     





     





     


 


Albuterol/Ipratropium  3 ml  10/16/19 11:00  10/17/19 18:23





  Duoneb  NEB   3 ml





  [id number] LANDON   Administration





     





     





     





     


 


Aspirin  81 mg  10/16/19 09:00  10/17/19 08:33





  Ecotrin  PO   81 mg





  DAILY LANDON   Administration





     





     





     





     


 


Carvedilol  6.25 mg  10/16/19 09:00  10/17/19 08:33





  Coreg  PO   6.25 mg





  BID LANDON   Administration





     





     





     





     


 


Duloxetine HCl  20 mg  10/16/19 09:00  10/17/19 08:33





  Cymbalta  PO   20 mg





  DAILY LANDON   Administration





     





     





     





     


 


Enoxaparin Sodium  30 mg  10/15/19 09:00  10/17/19 08:35





  Lovenox  SC   30 mg





  0900 LANDON   Administration





     





     





     





     


 


Sodium Bicarbonate 75 meq/  1,075 mls @ 100 mls/hr  10/16/19 18:15  10/16/19 20:

49





  Sodium Chloride  IV   1,075 mls





  INF LANDON   Administration





     





     





     





     


 


Melatonin  6 mg  10/15/19 22:53  10/15/19 22:59





  Melatonin  PO   6 mg





  HS PRN   Administration





  Insomnia   





     





     





     


 


Pantoprazole Sodium  40 mg  10/16/19 09:00  10/17/19 08:34





  Protonix  IVP   40 mg





  BID LANDON   Administration





     





     





     





     


 


Ranolazine  500 mg  10/16/19 09:00  10/17/19 08:33





  Ranexa  PO   500 mg





  BID LANDON   Administration





     





     





     





     


 


Rosuvastatin Calcium  20 mg  10/16/19 21:00  10/16/19 20:48





  Crestor  PO   20 mg





  HS LANDON   Administration





     





     





     





     


 


Sodium Chloride  10 ml  10/16/19 21:00  10/17/19 08:34





  Flush - Normal Saline  IVF   10 ml





  Q12HR LANDON   Administration





     





     





     





     


 


Trospium  20 mg  10/16/19 09:00  10/17/19 08:33





  Trospium  PO   20 mg





  BID LANDON   Administration





     





     





     





     














- Exam


Neck: negative: supple, symmetric, no JVD, no thyromegaly, no lymphadenopathy, 

no carotid bruit, JVD


Heart: negative: RRR, no murmur, no gallops, no rubs, normal peripheral pulses, 

irregular, diminshed peripheral pulses, murmur present, II/IV, III/IV


Respiratory: negative: CTAB, no wheezes, no rales, no ronchi, normal chest 

expansion, no tachypnea, normal percussion, rales, rhonchi, tachypneic, wheezes


Gastrointestinal: soft, non-tender, normal bowel sounds


Gastrointestinal - other findings: mild distention but soft





Hosp A/P


(1) Presbyesophagus


Code(s): K22.8 - OTHER SPECIFIED DISEASES OF ESOPHAGUS   Status: Acute   





(2) SHAQUILLE (acute kidney injury)


Code(s): N17.9 - ACUTE KIDNEY FAILURE, UNSPECIFIED   Status: Acute   





(3) DM2 (diabetes mellitus, type 2)


Status: Chronic   





(4) Dementia


Code(s): F03.90 - UNSPECIFIED DEMENTIA WITHOUT BEHAVIORAL DISTURBANCE   Status: 

Chronic   





(5) HTN (hypertension)


Code(s): I10 - ESSENTIAL (PRIMARY) HYPERTENSION   Status: Chronic   





(6) UTI (urinary tract infection)


Status: Acute   





(7) Hydronephrosis, left


Code(s): N13.30 - UNSPECIFIED HYDRONEPHROSIS   Status: Acute   





(8) Rectal cancer


Code(s): C20 - MALIGNANT NEOPLASM OF RECTUM   Status: Acute   





- Plan





pt's barium swallow indicated presbyesophagus, will start ppi and pt will need 

his food to be pureed. will continue abx, his mentation has improved. 





10/16 will start pt on magcitrate since he has not had a bm for a few days per 

family. family does not want anything aggressive. pt's sister wants him to be 

comfortable and possible hospice.





10/17 unable to have a bowel movement. spoke with surgery and recommended 

mame. sister wants pt to go home with hospice will do so. creatinine is 

improving.

## 2019-10-18 LAB
ANION GAP SERPL CALC-SCNC: 15 MMOL/L (ref 10–20)
BUN SERPL-MCNC: 46 MG/DL (ref 8.4–25.7)
CALCIUM SERPL-MCNC: 8.8 MG/DL (ref 7.8–10.44)
CHLORIDE SERPL-SCNC: 100 MMOL/L (ref 98–107)
CO2 SERPL-SCNC: 25 MMOL/L (ref 23–31)
CREAT CL PREDICTED SERPL C-G-VRATE: 23 ML/MIN (ref 70–130)
GLUCOSE SERPL-MCNC: 131 MG/DL (ref 83–110)
POTASSIUM SERPL-SCNC: 5 MMOL/L (ref 3.5–5.1)
SODIUM SERPL-SCNC: 135 MMOL/L (ref 136–145)

## 2019-10-18 RX ADMIN — Medication SCH: at 10:21

## 2019-10-18 RX ADMIN — Medication SCH ML: at 21:49

## 2019-10-18 RX ADMIN — Medication PRN ML: at 09:57

## 2019-10-18 RX ADMIN — Medication PRN ML: at 13:51

## 2019-10-18 RX ADMIN — ASPIRIN SCH MG: 81 TABLET ORAL at 09:54

## 2019-10-18 NOTE — PDOC.HOSPP
- Subjective


Encounter Date: 10/18/19


Encounter Time: 10:30


Subjective: 





pt up in bed no bowel movement. 





- Objective


Vital Signs & Weight: 


 Vital Signs (12 hours)











  Temp Pulse Resp BP BP Pulse Ox


 


 10/18/19 10:40   88  16   


 


 10/18/19 09:54     141/67 H  


 


 10/18/19 08:23  98.5 F  83  18   141/67 H  92 L


 


 10/18/19 08:00       92 L


 


 10/18/19 06:15   88  20   


 


 10/18/19 04:00  98.5 F  85  20   125/61  90 L








 Weight











Admit Weight                   182 lb 6.4 oz


 


Weight                         182 lb 6.4 oz














I&O: 


 











 10/17/19 10/18/19 10/19/19





 06:59 06:59 06:59


 


Intake Total 900 1810 


 


Output Total 1600 1250 


 


Balance -700 560 











Result Diagrams: 


 10/15/19 05:44





 10/18/19 08:27





Hospitalist ROS





- Review of Systems


Other: 





unable to obtain





- Medication


Medications: 


Active Medications











Generic Name Dose Route Start Last Admin





  Trade Name Freq  PRN Reason Stop Dose Admin


 


Acetaminophen  650 mg  10/14/19 17:44  10/16/19 04:17





  Tylenol  PO   650 mg





  Q4H PRN   Administration





  Headache/Fever/Mild Pain (1-3)   





     





     





     


 


Albuterol/Ipratropium  3 ml  10/16/19 11:00  10/18/19 10:40





  Duoneb  NEB   3 ml





  [id number] LANDON   Administration





     





     





     





     


 


Aspirin  81 mg  10/16/19 09:00  10/18/19 09:54





  Ecotrin  PO   81 mg





  DAILY LANDON   Administration





     





     





     





     


 


Carvedilol  6.25 mg  10/16/19 09:00  10/18/19 09:54





  Coreg  PO   6.25 mg





  BID LANDON   Administration





     





     





     





     


 


Duloxetine HCl  20 mg  10/16/19 09:00  10/18/19 10:15





  Cymbalta  PO   20 mg





  DAILY LANDON   Administration





     





     





     





     


 


Enoxaparin Sodium  30 mg  10/15/19 09:00  10/18/19 10:00





  Lovenox  SC   30 mg





  0900 LANDON   Administration





     





     





     





     


 


Sodium Bicarbonate 75 meq/  1,075 mls @ 100 mls/hr  10/16/19 18:15  10/16/19 20:

49





  Sodium Chloride  IV   1,075 mls





  INF LANDON   Administration





     





     





     





     


 


Melatonin  6 mg  10/15/19 22:53  10/15/19 22:59





  Melatonin  PO   6 mg





  HS PRN   Administration





  Insomnia   





     





     





     


 


Pantoprazole Sodium  40 mg  10/16/19 09:00  10/18/19 13:47





  Protonix  IVP   40 mg





  BID LANDON   Administration





     





     





     





     


 


Ranolazine  500 mg  10/16/19 09:00  10/18/19 09:59





  Ranexa  PO   500 mg





  BID LANDON   Administration





     





     





     





     


 


Rosuvastatin Calcium  20 mg  10/16/19 21:00  10/17/19 22:38





  Crestor  PO   20 mg





  HS LANDON   Administration





     





     





     





     


 


Sodium Chloride  10 ml  10/16/19 21:00  10/18/19 10:21





  Flush - Normal Saline  IVF   Not Given





  Q12HR LANDON   





     





     





     





     


 


Sodium Chloride  10 ml  10/16/19 09:14  10/18/19 13:51





  Flush - Normal Saline  IVF   10 ml





  PRN PRN   Administration





  Saline Flush   





     





     





     


 


Trospium  20 mg  10/16/19 09:00  10/17/19 08:33





  Trospium  PO   20 mg





  BID LANDON   Administration





     





     





     





     














- Exam


Neck: negative: supple, symmetric, no JVD, no thyromegaly, no lymphadenopathy, 

no carotid bruit, JVD


Heart: negative: RRR, no murmur, no gallops, no rubs, normal peripheral pulses, 

irregular, diminshed peripheral pulses, murmur present, II/IV, III/IV


Respiratory: negative: CTAB, no wheezes, no rales, no ronchi, normal chest 

expansion, no tachypnea, normal percussion, rales, rhonchi, tachypneic, wheezes


Gastrointestinal: soft, normal bowel sounds


Gastrointestinal - other findings: abdomen distended





Hosp A/P


(1) Presbyesophagus


Code(s): K22.8 - OTHER SPECIFIED DISEASES OF ESOPHAGUS   Status: Acute   





(2) SHAQUILLE (acute kidney injury)


Code(s): N17.9 - ACUTE KIDNEY FAILURE, UNSPECIFIED   Status: Acute   





(3) DM2 (diabetes mellitus, type 2)


Status: Chronic   





(4) Dementia


Code(s): F03.90 - UNSPECIFIED DEMENTIA WITHOUT BEHAVIORAL DISTURBANCE   Status: 

Chronic   





(5) HTN (hypertension)


Code(s): I10 - ESSENTIAL (PRIMARY) HYPERTENSION   Status: Chronic   





(6) UTI (urinary tract infection)


Status: Acute   





(7) Hydronephrosis, left


Code(s): N13.30 - UNSPECIFIED HYDRONEPHROSIS   Status: Acute   





(8) Rectal cancer


Code(s): C20 - MALIGNANT NEOPLASM OF RECTUM   Status: Acute   





- Plan





pt's barium swallow indicated presbyesophagus, will start ppi and pt will need 

his food to be pureed. will continue abx, his mentation has improved. 





10/16 will start pt on magcitrate since he has not had a bm for a few days per 

family. family does not want anything aggressive. pt's sister wants him to be 

comfortable and possible hospice.





10/17 unable to have a bowel movement. spoke with surgery and recommended 

mame. sister wants pt to go home with hospice will do so. creatinine is 

improving. 





10/18 pt had small bowel movement, he has good bowel sounds. pt will go home 

with hospice once he has a bowel movement.

## 2019-10-19 VITALS — DIASTOLIC BLOOD PRESSURE: 77 MMHG | SYSTOLIC BLOOD PRESSURE: 136 MMHG | TEMPERATURE: 98.5 F

## 2019-10-19 RX ADMIN — Medication SCH ML: at 09:02

## 2019-10-19 RX ADMIN — ASPIRIN SCH MG: 81 TABLET ORAL at 09:01

## 2019-10-19 NOTE — RAD
Radiograph abdomen one view:



DATE:

10/19/2019



HISTORY:

84-year-old male with generalized abdominal pain.



FINDINGS:

Dilute oral contrast material from ascending colon through transverse colon and descending and proxim
al sigmoid colon. Gas in multiple loops of nondilated colon and small bowel. No evidence of

organomegaly. The colonic contrast material is less dense than it was on 10/17/2019.



IMPRESSION:

No evidence of bowel obstruction.



Reported By: Jason De La Torre 

Electronically Signed:  10/19/2019 11:34 AM

## 2019-10-19 NOTE — EKG
Test Reason : 

Blood Pressure : ***/*** mmHG

Vent. Rate : 083 BPM     Atrial Rate : 083 BPM

   P-R Int : 192 ms          QRS Dur : 124 ms

    QT Int : 404 ms       P-R-T Axes : 042 036 153 degrees

   QTc Int : 474 ms

 

Normal sinus rhythm

Possible Left atrial enlargement

Non-specific intra-ventricular conduction delay



Abnormal ECG

 

Confirmed by LOU NIEVES DO [id number]),  SCOT FRANCIS ([id number]) on 10/19/2019 2:38:44 PM

 

Referred By:             Confirmed By:LOU NIEVES DO

## 2019-10-19 NOTE — DIS
DATE OF ADMISSION:  10/14/2019



DATE OF DISCHARGE:  10/19/2019



PRIMARY CARE PROVIDER:  Damaris Noland MD



DISCHARGE DIAGNOSES:  

1. Acute metabolic encephalopathy, likely secondary to worsening renal function.

2. Acute on chronic stage 3 renal failure.

3. Hydronephrosis.

4. Perirectal lymphadenopathy.

5. Probable rectal mass.

6. Hyponatremia.

7. Hyperkalemia.

8. Presbyesophagus.



CONDITION OF PATIENT ON THE DAY OF DISCHARGE:  I assessed Mr. Shearer on the day of

discharge.  He denies any chest pain or shortness of breath.  Vital signs are

stable.  S1 and S2 are heard, regular.  Lungs are clear to auscultation bilaterally. 



CONSULTATIONS DURING THIS HOSPITALIZATION:  Nephrology, Dr. Sena.



FOLLOWUP APPOINTMENTS:  The patient is advised to follow up with primary care

provider in 3 days time. 



DISCHARGE MEDICATIONS:  

1. [place]2 [place] 9[address] units every week.

2. Aspirin 81 mg daily.

3. Coreg 6.25 mg 2 times a day.

4. Plavix 75 mg daily.

5. Cymbalta 20 mg daily.

6. Esomeprazole 40 mg daily.

7. Memantine 10 mg 2 times a day.

8. Reglan 5 mg 2 times a day.

9. Ranexa 500 mg 2 times a day.

10. Crestor 20 mg at bedtime.

11. Senokot two tablet as needed.

12. VESIcare 10 mg daily.

13. Colace 100 mg 2 times a day.

14. MiraLAX 17 g daily.



HOSPITAL COURSE:  Mr. Shearer is a pleasant 84-year-old gentleman, who was admitted

to [facility] for acute metabolic encephalopathy on October 14, 2019.  Please refer to Dr. Rodriguez's history and physical note dated October 14, 2019, for further details.  He was signed by Nephrology Service for acute on chronic

renal failure.  He was also found to have intraabdominal lymphadenopathy with likely

rectal malignancy.  Family did not wish for any interventions at this time.  He had

barium swallow done on October 15, 2019, which showed severe presbyesophagus and

diminished motility of stomach and duodenum.  The patient was constipated, which

resolved with laxatives.  He is being discharged home for hospice care at home

through [facility]. 



Many thanks for allowing me to participate in your patient's care.  Please feel free

to contact me with any questions or concerns. 



DISCHARGE DESTINATION:  Home with [facility] for home hospice.



TIME SPENT:  Total amount of time spent coordinating this discharge:  32 minutes.







Job ID:  [id number]